# Patient Record
Sex: FEMALE | Race: WHITE
[De-identification: names, ages, dates, MRNs, and addresses within clinical notes are randomized per-mention and may not be internally consistent; named-entity substitution may affect disease eponyms.]

---

## 2018-05-30 ENCOUNTER — HOSPITAL ENCOUNTER (OUTPATIENT)
Dept: HOSPITAL 62 - WI | Age: 43
End: 2018-05-30
Attending: OBSTETRICS & GYNECOLOGY
Payer: COMMERCIAL

## 2018-05-30 DIAGNOSIS — Z12.31: Primary | ICD-10-CM

## 2018-05-30 PROCEDURE — 77067 SCR MAMMO BI INCL CAD: CPT

## 2018-05-30 NOTE — WOMENS IMAGING REPORT
EXAM DESCRIPTION:  BILAT SCREENING MAMMO W/CAD



COMPLETED DATE/TIME:  5/30/2018 4:16 pm



REASON FOR STUDY:  ROUTINE SCREENING;Z12.31 Z12.31  ENCNTR SCREEN MAMMOGRAM FOR MALIGNANT NEOPLASM OF
 JOSE



COMPARISON:  2017



TECHNIQUE:  Standard craniocaudal and mediolateral oblique views of each breast recorded using digita
l acquisition.



LIMITATIONS:  None.



FINDINGS:  No masses, calcifications or architectural distortion. No areas of suspicion.

Read with the assistance of CAD.

.[facility] - R2 [employer] Version 1.3

.[facility] - R2 [employer] Version 1.3

.[facility] - R2 [employer] Version 2.4

.[facility] - R2 C[employer]ova Version 2.4

.[facility] - R2  Version 9.2



IMPRESSION:  NORMAL MAMMOGRAM.  BIRADS 1.



BREAST DENSITY:  c. The breasts are heterogeneously dense, which may obscure small masses.



BIRAD:  1 NEGATIVE



RECOMMENDATION:  ROUTINE SCREENING



COMMENT:  The patient has been notified of the results by letter per SA requirements. Additional no
tification policies are in place for contacting patient with suspicious or incomplete findings.

Quality ID #225: The American College of Radiology recommends an annual screening mammogram for women
 aged 40 years or over. This facility utilizes a reminder system to ensure that all patients receive 
reminder letters, and/or direct phone calls for appointments. This includes reminders for routine scr
eening mammograms, diagnostic mammograms, or other Breast Imaging Interventions when appropriate.  Th
is patient will be placed in the appropriate reminder system.

The American College of Radiology (ACR) has developed recommendations for screening MRI of the breast
s in certain patient populations, to be used in conjunction with mammography.  Breast MRI surveillanc
e may be appropriate for women with more than 20% lifetime risk of developing breast cancer  as deter
mined by genetic testing, significant family history of the disease, or history of mantle radiation f
or Hodgkins Disease.  ACR Practice Guidelines 2008.



TECHNICAL DOCUMENTATION:  FINDING NUMBER: (1)

ASSESSMENT: (1)

JOB ID:  [id number]

 2011 [employer]- All Rights Reserved



Reading location - IP/workstation name: [email or web address]

## 2019-06-13 ENCOUNTER — HOSPITAL ENCOUNTER (OUTPATIENT)
Dept: HOSPITAL 62 - WI | Age: 44
End: 2019-06-13
Attending: OBSTETRICS & GYNECOLOGY
Payer: COMMERCIAL

## 2019-06-13 DIAGNOSIS — N60.01: Primary | ICD-10-CM

## 2019-06-13 PROCEDURE — 77066 DX MAMMO INCL CAD BI: CPT

## 2019-06-13 PROCEDURE — 76642 ULTRASOUND BREAST LIMITED: CPT

## 2019-06-16 NOTE — WOMENS IMAGING REPORT
EXAM DESCRIPTION:  BILAT DIAGNOSTIC MAMMO W/CAD; U/S BREAST UNILAT LIMITED



COMPLETED DATE/TIME:  6/13/2019 12:36 pm; 6/13/2019 12:59 pm



REASON FOR STUDY:  N63.11 UNSPECIFIED LUMP IN THE RIGHT BREAST,UPPER OUTER QUADRANT; RT BREAST LUMP N
63.11  UNSPECIFIED LUMP IN THE RIGHT BREAST, UPPER OUTER RICHARD



COMPARISON:  Mammograms 5/30/2018, 5/10/2017



EXAM PARAMETERS:  Standard craniocaudal and mediolateral oblique views of each breast recorded using 
digital acquisition.  Additional right breast exaggerated craniocaudad view.  Additional cone fitz
ada upper outer quadrant right breast in the CC and MLO orientations.

Right breast ultrasound was also performed.

Read with the assistance of CAD:

.[facility]  Version 9.2



LIMITATIONS:  None.



FINDINGS:  RIGHT BREAST

MASSES: In the upper outer quadrant about 5 to 7 cm from the nipple, 2 well-circumscribed mammographi
c nodules are present each about 2 cm in diameter.  These are at about the 10 to 11 o'clock position.


Medial in the right breast about 8 cm from the nipple a low-density well-circumscribed 1.4 cm nodule 
is present, similar compared to mammograms from 2017.

CALCIFICATIONS: No new or suspicious calcifications.

ARCHITECTURAL DISTORTION: None.

DEVELOPING DENSITY: None.

ASYMMETRY: None noted.

OTHER: No other significant findings.

LEFT BREAST

MASSES: No suspicious masses.

CALCIFICATIONS: No new or suspicious calcifications.

ARCHITECTURAL DISTORTION: None.

DEVELOPING DENSITY: None.

ASYMMETRY: None noted.

OTHER: No other significant finding.

Right breast ultrasound:

Patient presents with a palpable abnormality in the upper outer quadrant right breast.  Ultrasound of
 this area demonstrates a well-circumscribed 2 x 1 cm simple breast cyst at the 10 to 11 o'clock posi
tion.  Just deep to this cyst, a 2 x 1 cm hypoechoic solid nodule is present with good acoustic throu
gh transmission at the 10 to 11 o'clock position, compatible with a benign fibroadenoma.

In the medial right breast about 8 cm from the nipple, a 1.3 x 0.9 cm hypoechoic solid nodule is pres
ent with good acoustic through transmission likely a benign fibroadenoma.



IMPRESSION:  No mammographic or sonographic evidence for malignancy right breast.

No mammographic evidence for malignancy left breast



BREAST DENSITY:  c. The breasts are heterogeneously dense, which may obscure small masses.



BIRAD:  ASSESSMENT:  2 Benign findings.



RECOMMENDATION:  RECOMMENDED FOLLOW UP: Please continue yearly bilateral screening mammography/ tomos
ynthesis in July 2020.  Consider tomosynthesis given heterogeneously dense fibroglandular tissue bila
terally

SPECIFIC INTERVENTION/IMAGING/CONSULTATION RECOMMENDED:No additional intervention/ imaging/consultati
on needed at this time.

COMMUNICATION:Patient notified by letter



COMMENT:  The patient has been notified of the results by letter per SA requirements. Additional no
tification policies are in place for contacting patient with suspicious or incomplete findings.

Quality ID #225: The American College of Radiology recommends an annual screening mammogram for women
 aged 40 years or over. This facility utilizes a reminder system to ensure that all patients receive 
reminder letters, and/or direct phone calls for appointments. This includes reminders for routine scr
eening mammograms, diagnostic mammograms, or other Breast Imaging Interventions when appropriate.  Th
is patient will be placed in the appropriate reminder system.



TECHNICAL DOCUMENTATION:  FINDING NUMBER: (1)

ASSESSMENT: (1)

JOB ID:  [id number]

 2011 [employer]- All Rights Reserved



Reading location - IP/workstation name: [email or web address]

## 2019-06-16 NOTE — WOMENS IMAGING REPORT
EXAM DESCRIPTION:  BILAT DIAGNOSTIC MAMMO W/CAD; U/S BREAST UNILAT LIMITED



COMPLETED DATE/TIME:  6/13/2019 12:36 pm; 6/13/2019 12:59 pm



REASON FOR STUDY:  N63.11 UNSPECIFIED LUMP IN THE RIGHT BREAST,UPPER OUTER QUADRANT; RT BREAST LUMP N
63.11  UNSPECIFIED LUMP IN THE RIGHT BREAST, UPPER OUTER RICHARD



COMPARISON:  Mammograms 5/30/2018, 5/10/2017



EXAM PARAMETERS:  Standard craniocaudal and mediolateral oblique views of each breast recorded using 
digital acquisition.  Additional right breast exaggerated craniocaudad view.  Additional cone fitz
ada upper outer quadrant right breast in the CC and MLO orientations.

Right breast ultrasound was also performed.

Read with the assistance of CAD:

[facility] [employer]  Version 9.2



LIMITATIONS:  None.



FINDINGS:  RIGHT BREAST

MASSES: In the upper outer quadrant about 5 to 7 cm from the nipple, 2 well-circumscribed mammographi
c nodules are present each about 2 cm in diameter.  These are at about the 10 to 11 o'clock position.


Medial in the right breast about 8 cm from the nipple a low-density well-circumscribed 1.4 cm nodule 
is present, similar compared to mammograms from 2017.

CALCIFICATIONS: No new or suspicious calcifications.

ARCHITECTURAL DISTORTION: None.

DEVELOPING DENSITY: None.

ASYMMETRY: None noted.

OTHER: No other significant findings.

LEFT BREAST

MASSES: No suspicious masses.

CALCIFICATIONS: No new or suspicious calcifications.

ARCHITECTURAL DISTORTION: None.

DEVELOPING DENSITY: None.

ASYMMETRY: None noted.

OTHER: No other significant finding.

Right breast ultrasound:

Patient presents with a palpable abnormality in the upper outer quadrant right breast.  Ultrasound of
 this area demonstrates a well-circumscribed 2 x 1 cm simple breast cyst at the 10 to 11 o'clock posi
tion.  Just deep to this cyst, a 2 x 1 cm hypoechoic solid nodule is present with good acoustic throu
gh transmission at the 10 to 11 o'clock position, compatible with a benign fibroadenoma.

In the medial right breast about 8 cm from the nipple, a 1.3 x 0.9 cm hypoechoic solid nodule is pres
ent with good acoustic through transmission likely a benign fibroadenoma.



IMPRESSION:  No mammographic or sonographic evidence for malignancy right breast.

No mammographic evidence for malignancy left breast



BREAST DENSITY:  c. The breasts are heterogeneously dense, which may obscure small masses.



BIRAD:  ASSESSMENT:  2 Benign findings.



RECOMMENDATION:  RECOMMENDED FOLLOW UP: Please continue yearly bilateral screening mammography/ tomos
ynthesis in July 2020.  Consider tomosynthesis given heterogeneously dense fibroglandular tissue bila
terally

SPECIFIC INTERVENTION/IMAGING/CONSULTATION RECOMMENDED:No additional intervention/ imaging/consultati
on needed at this time.

COMMUNICATION:Patient notified by letter



COMMENT:  The patient has been notified of the results by letter per SA requirements. Additional no
tification policies are in place for contacting patient with suspicious or incomplete findings.

Quality ID #225: The American College of Radiology recommends an annual screening mammogram for women
 aged 40 years or over. This facility utilizes a reminder system to ensure that all patients receive 
reminder letters, and/or direct phone calls for appointments. This includes reminders for routine scr
eening mammograms, diagnostic mammograms, or other Breast Imaging Interventions when appropriate.  Th
is patient will be placed in the appropriate reminder system.



TECHNICAL DOCUMENTATION:  FINDING NUMBER: (1)

ASSESSMENT: (1)

JOB ID:  [id number]

 2011 [employer]- All Rights Reserved



Reading location - IP/workstation name: [email or web address]